# Patient Record
Sex: MALE | Race: WHITE
[De-identification: names, ages, dates, MRNs, and addresses within clinical notes are randomized per-mention and may not be internally consistent; named-entity substitution may affect disease eponyms.]

---

## 2017-10-25 ENCOUNTER — HOSPITAL ENCOUNTER (EMERGENCY)
Dept: HOSPITAL 56 - MW.ED | Age: 52
Discharge: HOME | End: 2017-10-25
Payer: MEDICAID

## 2017-10-25 VITALS — SYSTOLIC BLOOD PRESSURE: 138 MMHG | DIASTOLIC BLOOD PRESSURE: 93 MMHG

## 2017-10-25 DIAGNOSIS — R07.9: Primary | ICD-10-CM

## 2017-10-25 DIAGNOSIS — F32.9: ICD-10-CM

## 2017-10-25 DIAGNOSIS — M15.0: ICD-10-CM

## 2017-10-25 DIAGNOSIS — I10: ICD-10-CM

## 2017-10-25 LAB
CHLORIDE SERPL-SCNC: 106 MMOL/L (ref 98–110)
SODIUM SERPL-SCNC: 146 MMOL/L (ref 136–146)

## 2017-10-25 PROCEDURE — 99285 EMERGENCY DEPT VISIT HI MDM: CPT

## 2017-10-25 PROCEDURE — 80053 COMPREHEN METABOLIC PANEL: CPT

## 2017-10-25 PROCEDURE — 36415 COLL VENOUS BLD VENIPUNCTURE: CPT

## 2017-10-25 PROCEDURE — 93005 ELECTROCARDIOGRAM TRACING: CPT

## 2017-10-25 PROCEDURE — 85025 COMPLETE CBC W/AUTO DIFF WBC: CPT

## 2017-10-25 PROCEDURE — 96372 THER/PROPH/DIAG INJ SC/IM: CPT

## 2017-10-25 PROCEDURE — 96374 THER/PROPH/DIAG INJ IV PUSH: CPT

## 2017-10-25 PROCEDURE — 71010: CPT

## 2017-10-25 PROCEDURE — 84484 ASSAY OF TROPONIN QUANT: CPT

## 2017-10-25 NOTE — EDM.PDOC
ED HPI GENERAL MEDICAL PROBLEM





- General


Stated Complaint: SOB


Time Seen by Provider: 10/25/17 20:13


Source of Information: Reports: Patient


History Limitations: Reports: No Limitations





- History of Present Illness


INITIAL COMMENTS - FREE TEXT/NARRATIVE: 





HISTORY AND PHYSICAL:





History of present illness:


Patient is a 52-year-old male who presents to the emergency room today with 

complaints of sternal chest pain and concerns of high blood pressure. Patient 

reports that he has been without his lisinopril for over one month as he has 

not been able to get to his primary care provider. He states that today he 

started having some midsternal chest pain after drinking several alcoholic 

drinks. He is concerned that this is related to not having his antihypertensive 

medications for "some time". He states he has been taking aspirin daily to 

supplement these medications.


Patient had 325 of aspirin prior to arrival today


Denies any shortness of breath, diaphoresis, nausea, vomiting or diarrhea.


Denies any headache, change in vision, or near syncope.





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 

rancher and drives truck. He states that his joints hurt "all over". Denies any 

recent injury or trauma.





Review of systems: 


As per history of present illness and below otherwise all systems reviewed and 

negative.





Past medical history: 


As per history of present illness and as reviewed below otherwise 

noncontributory.





Surgical history: 


As per history of present illness and as reviewed below otherwise 

noncontributory.





Social history: 


No reported history of drug or alcohol abuse.





Family history: 


As per history of present illness and as reviewed below otherwise 

noncontributory.





Physical exam:


Gen.: Nontoxic appearing 52-year-old male. Able to speak in full sentences 

without shortness of breath. Alert and oriented.


HEENT: Atraumatic, normocephalic, pupils reactive, negative for conjunctival 

pallor or scleral icterus, mucous membranes moist, throat clear, neck supple, 

nontender, trachea midline.


Lungs: Clear to auscultation, breath sounds equal bilaterally, chest nontender.


Heart: S1S2, tachycardia, regular rate and rhythm


Abdomen: Soft, nondistended, nontender. Negative for masses or 

hepatosplenomegaly. Negative for costovertebral tenderness.


Pelvis: Stable nontender.


Genitourinary: Deferred.


Rectal: Deferred.


Extremities: Atraumatic, negative for cords or calf pain. Neurovascular 

unremarkable.


Neuro: Awake, alert, oriented. Cranial nerves II through XII unremarkable. 

Cerebellum unremarkable. Motor and sensory unremarkable throughout. Exam 

nonfocal.





Patient states that he has had several beers prior to arrival. He did have 

325mg aspirin as well. Besides the primary complaint of chest pain patient 

states that his joints hurt all over as he is a "rancher and  "and 

is on his feet all day. He does not have any recent injury, falls or trauma. He 

states he has not seen his primary care provider in several months. When 

further investigating his joint pain he is very nonspecific and unable to state 

what actually hurts. He does say that he does get arthritic pain which this 

does feel similar. At this time I do not feel we need any x-rays related to 

this concern. I did inform him that he needs to follow-up with the primary care 

provider not only for his blood pressure management and concerned that he needs 

antihypertensive medications but also for his arthritic pain. Patient voices 

understanding, although hesitant.





Patient removed his IV and the leads further cardiac monitor. Patient is up 

ambulating in the hallway, gait is steady and patient is alert and oriented. 

Patient's labs, x-ray and EKG were discussed with the patient. He states that 

his chest pain has resolved but still has body aches. Patient was offered 

admission at this time, he declined. Risks vs benefits were reviewed in detail. 

He states he would like a shot of "morphine"for pain and be discharged to home. 

Did state I would give him an injection of Toradol and give him discharge 

instructions. Informed patient to stop taking or than the recommended amount of 

aspirin. Informed him he needed to follow-up with his primary care provider for 

his concerns of his blood pressure. Today's readings were all within normal 

limits and do not require any antihypertensive medications. Patient voices 

understanding and is agreeable to plan of care. Denies any further questions at 

this time.








Diagnostics:


CBC, CMP, troponin, EKG, chest x-ray





Therapeutics:


Aspirin


Nitroglycerin





Impression: 


Chest pain, non-specific


Arthritis





Plan:


1.  You have been offered admission to the hospital at this time, he declined. 

I would like you to follow-up with your primary care provider in the next 1-2 

days for reevaluation. At that time he may also address her concerns of your 

high blood pressure.


2. Stop drinking alcohol


3. Stop taking more that the recommended amounts of NSAIDS (Aspiring, Ibuprofen

, Aleve) as this can cause GI upset and bleeding.


4. Return to the ED as needed and as discussed





Definitive disposition and diagnosis as appropriate pending reevaluation and 

review of above.





Onset: Today


Duration: Hour(s):


Location: Reports: Chest


  ** chest pain


Pain Score (Numeric/FACES): 4





- Related Data


 Allergies











Allergy/AdvReac Type Severity Reaction Status Date / Time


 


Penicillins Allergy  Other Verified 10/25/17 21:03











Home Meds: 


 Home Meds





Lisinopril 40 mg PO DAILY 05/19/16 [History]


Folic Acid 1 mg PO BEDTIME  tablet 05/20/16 [Rx]


LORazepam [Ativan] 1 mg IVPUSH Q2H PRN #2 vial 05/20/16 [Rx]


Multivitamins,Therapeutic [Thera] 1 each PO BEDTIME  tablet 05/20/16 [Rx]


Nicotine [Habitrol] 21 mg TRDERM DAILY  patch 05/20/16 [Rx]


Thiamine [Vitamin B-1] 100 mg PO BEDTIME  tablet 05/20/16 [Rx]


cloNIDine [Catapres] 0.1 mg PO Q8H PRN #0 tablet 05/20/16 [Rx]











Past Medical History


HEENT History: Reports: Allergic Rhinitis


Cardiovascular History: Reports: Hypertension


Psychiatric History: Reports: Addiction, Depression, Suicidal Ideation





- Past Surgical History


Musculoskeletal Surgical History: Reports: Other (See Below)





Social & Family History





- Family History


Cardiac: Reports: Other (See Below)


Other Cardiac Family History: valve replacement


Oncologic: Reports: Esophageal, Lung, Renal





- Tobacco Use


Smoking Status *Q: Light Tobacco Smoker


Years of Tobacco use: 30


Packs/Tins Daily: 1





- Alcohol Use


Days Per Week of Alcohol Use: 7


Number of Drinks Per Day: 10


Total Drinks Per Week: 70





- Recreational Drug Use


Recreational Drug Use: Yes


Recreational Drug Type: Reports: Amphetamines (Speed), Marijuana/Hashish





ED ROS GENERAL





- Review of Systems


Review Of Systems: ROS reveals no pertinent complaints other than HPI.





ED EXAM, GENERAL





- Physical Exam


Exam: See Below (See dictation)





Course





- Vital Signs


Last Recorded V/S: 


 Last Vital Signs











Temp  36.3 C   10/25/17 20:15


 


Pulse  114 H  10/25/17 20:15


 


Resp  18   10/25/17 20:15


 


BP  138/93 H  10/25/17 20:15


 


Pulse Ox  98   10/25/17 20:15














- Orders/Labs/Meds


Orders: 


 Active Orders 24 hr











 Category Date Time Status


 


 EKG Documentation Completion [RC] STAT Care  10/25/17 20:13 Active


 


 Chest 1V Frontal [CR] Stat Exams  10/25/17 20:13 Taken











Labs: 


 Laboratory Tests











  10/25/17 10/25/17 Range/Units





  20:15 20:15 


 


WBC  7.21   (4.0-11.0)  K/uL


 


RBC  4.79   (4.50-5.90)  M/uL


 


Hgb  15.1   (13.0-17.0)  g/dL


 


Hct  43.0   (38.0-50.0)  %


 


MCV  89.8   (80.0-98.0)  fL


 


MCH  31.5   (27.0-32.0)  pg


 


MCHC  35.1   (31.0-37.0)  g/dL


 


RDW Std Deviation  44.8   (28.0-62.0)  fl


 


RDW Coeff of Carlito  14   (11.0-15.0)  %


 


Plt Count  180   (150-400)  K/uL


 


MPV  11.10   (7.40-12.00)  fL


 


Neut % (Auto)  42.1 L   (48.0-80.0)  %


 


Lymph % (Auto)  43.7 H   (16.0-40.0)  %


 


Mono % (Auto)  11.0   (0.0-15.0)  %


 


Eos % (Auto)  2.6   (0.0-7.0)  %


 


Baso % (Auto)  0.6   (0.0-1.5)  %


 


Neut # (Auto)  3.0   (1.4-5.7)  K/uL


 


Lymph # (Auto)  3.2 H   (0.6-2.4)  K/uL


 


Mono # (Auto)  0.8   (0.0-0.8)  K/uL


 


Eos # (Auto)  0.2   (0.0-0.7)  K/uL


 


Baso # (Auto)  0.0   (0.0-0.1)  K/uL


 


Nucleated RBC %  0.0   /100WBC


 


Nucleated RBCs #  0   K/uL


 


Sodium   146  (136-146)  mmol/L


 


Potassium   4.3  (3.5-5.1)  mmol/L


 


Chloride   106  ()  mmol/L


 


Carbon Dioxide   28  (21-31)  mmol/L


 


BUN   11  (6.0-23.0)  mg/dL


 


Creatinine   0.8  (0.6-1.5)  mg/dL


 


Est Cr Clr Drug Dosing   131.10  mL/min


 


Estimated GFR (MDRD)   > 60.0  ml/min


 


Glucose   111 H  ()  mg/dL


 


Calcium   9.1  (8.8-10.8)  mg/dL


 


Total Bilirubin   0.5  (0.1-1.5)  mg/dL


 


AST   34  (5-40)  IU/L


 


ALT   19  (8-54)  IU/L


 


Alkaline Phosphatase   123  ()  


 


Troponin I   < 0.10  (0.0-0.29)  NG/ML


 


Total Protein   7.6  (6.0-8.0)  g/dL


 


Albumin   3.8  (3.5-5.0)  g/dL


 


Globulin   3.8 H  (2.0-3.5)  g/dL


 


Albumin/Globulin Ratio   1.0 L  (1.3-2.8)  











Meds: 


Medications














Discontinued Medications














Generic Name Dose Route Start Last Admin





  Trade Name Freq  PRN Reason Stop Dose Admin


 


Sodium Chloride  1,000 mls @ 999 mls/hr  10/25/17 20:13  10/25/17 20:36





  Normal Saline  IV  10/25/17 21:13  999 mls/hr





  STAT ONE   Administration


 


Ketorolac Tromethamine  30 mg  10/25/17 20:55  





  Toradol  IVPUSH  10/25/17 20:56  





  ONETIME ONE   


 


Ketorolac Tromethamine  60 mg  10/25/17 21:17  





  Toradol  IM  10/25/17 21:18  





  ONETIME ONE   


 


Ondansetron HCl  4 mg  10/25/17 20:13  10/25/17 20:36





  Zofran  IVPUSH  10/25/17 20:14  4 mg





  ONETIME ONE   Administration














Departure





- Departure


Time of Disposition: 21:24


Disposition: Home, Self-Care 01


Clinical Impression: 


 Nonspecific chest pain, Arthritis





Referrals: 


PCP,None [Primary Care Provider] - 


Additional Instructions: 


My general discharge


The following information is given to patients seen in the emergency department 

who are being discharged to home. This information is to outline your options 

for follow-up care. We provide all patients seen in our emergency department 

with a follow-up referral.





The need for follow-up, as well as the timing and circumstances, are variable 

depending upon the specifics of your emergency department visit.





If you don't have a primary care physician on staff, we will provide you with a 

referral. We always advise you to contact your personal physician following an 

emergency department visit to inform them of the circumstance of the visit and 

for follow-up with them and/or the need for any referrals to a consulting 

specialist.





The emergency department will also refer you to a specialist when appropriate. 

This referral assures that you have the opportunity for follow-up care with a 

specialist. All of these measure are taken in an effort to provide you with 

optimal care, which includes your follow-up.





Under all circumstances we always encourage you to contact your private 

physician who remains a resource for coordinating your care. When calling for 

follow-up care, please make the office aware that this follow-up is from your 

recent emergency room visit. If for any reason you are refused follow-up, 

please contact the [facility] Emergency 

Department at [phone] and asked to speak to the emergency department 

charge nurse.





[facility]


Primary Care


[address] [place] [address]


Phone: [phone]


Fax: [phone]





1.  You have been offered admission to the hospital at this time, he declined. 

I would like you to follow-up with your primary care provider in the next 1-2 

days for reevaluation. At that time he may also address her concerns of your 

high blood pressure.


2. Stop drinking alcohol


3. Stop taking more that the recommended amounts of NSAIDS (Aspiring, Ibuprofen

, Aleve) as this can cause GI upset and bleeding.


4. Return to the ED as needed and as discussed





- My Orders


Last 24 Hours: 


My Active Orders





10/25/17 20:13


EKG Documentation Completion [RC] STAT 


Chest 1V Frontal [CR] Stat 














- Assessment/Plan


Last 24 Hours: 


My Active Orders





10/25/17 20:13


EKG Documentation Completion [RC] STAT 


Chest 1V Frontal [CR] Stat

## 2017-10-26 NOTE — CR
EXAM DATE: 10/25/17



PATIENT'S AGE: 52





Patient: JOVANNI ROSARIO



Facility: [place], [place]

Patient ID: [id number]

Site Patient ID: [id number].

Site Accession #: [id number]

: 1965

Study: XRay Chest [id number]/25/2017 9:04:27 PM

Ordering Physician: Doctor Temp



Final Report: 

INDICATION: chest pain



TECHNIQUE:

Chest 1 view 



COMPARISON:

None 



FINDINGS:

Cardiovascular and mediastinum: Heart size and vasculature are normal in 
caliber and appearance. Mediastinum is within normal limits. 

Lungs and pleural space: No focal consolidation. No sign of pleural effusion. 
No pneumothorax. 

Bones: Remote healed 5th posterior left rib fracture. 



IMPRESSION:

No acute cardiopulmonary disease.



Dictated by Edward Stone MD @ 10/25/2017 9:16:37 PM





Dictated by: Edward Stone MD @ 10/25/2017 21:16:52

(Electronic Signature)



Report Signed by Proxy.
[facility]

## 2017-10-27 ENCOUNTER — HOSPITAL ENCOUNTER (EMERGENCY)
Dept: HOSPITAL 56 - MW.ED | Age: 52
Discharge: HOME | End: 2017-10-27
Payer: MEDICAID

## 2017-10-27 VITALS — SYSTOLIC BLOOD PRESSURE: 117 MMHG | DIASTOLIC BLOOD PRESSURE: 75 MMHG

## 2017-10-27 DIAGNOSIS — Z88.0: ICD-10-CM

## 2017-10-27 DIAGNOSIS — Z79.899: ICD-10-CM

## 2017-10-27 DIAGNOSIS — F10.10: ICD-10-CM

## 2017-10-27 DIAGNOSIS — I10: ICD-10-CM

## 2017-10-27 DIAGNOSIS — F17.210: ICD-10-CM

## 2017-10-27 DIAGNOSIS — R07.89: Primary | ICD-10-CM

## 2017-10-27 LAB
CHLORIDE SERPL-SCNC: 106 MMOL/L (ref 98–110)
SODIUM SERPL-SCNC: 141 MMOL/L (ref 136–146)

## 2017-10-27 NOTE — CR
EXAM DATE: 10/27/17



PATIENT'S AGE: 52



Patient: JOVANNI ROSARIO



Facility: [place], [place]

Patient ID: [id number]

Site Patient ID: [id number].

Site Accession #: [id number]

: 1965

Study: XRay Chest [id number]/27/2017 12:49:20 AM

Ordering Physician: Doctor Wilson



Final Report: 

INDICATION:

Chest pain, intoxication. 



TECHNIQUE:

Chest radiograph 1 view 



COMPARISON:

10/25/2017. 



FINDINGS:

Cardiovascular and mediastinum: The heart silhouette is normal in size and 
morphology. The mediastinum is normal in appearance. 

Lungs and pleural spaces: Both lungs are unremarkable in appearance. No sign of 
pleural effusion seen. No pneumothorax is identified. 

Bones and soft tissues: No significant findings. Old upper left rib fracture 
deformity. 



IMPRESSION:

1. No acute abnormality or interval change from 10/25/2017.



Dictated by Sha Akins MD @ 10/27/2017 12:51:52 AM



Dictated by: Sha Akins MD @ 10/27/2017 00:51:58

(Electronic Signature)



Report Signed by Proxy.
[facility]

## 2017-10-27 NOTE — EDM.PDOC
ED HPI GENERAL MEDICAL PROBLEM





- General


Chief Complaint: Chest Pain


Stated Complaint: OVERDOSE /INTOXICATED/CHEST PAIN


Time Seen by Provider: 10/27/17 00:39





- History of Present Illness


INITIAL COMMENTS - FREE TEXT/NARRATIVE: 





HISTORY AND PHYSICAL:





History of present illness:


Patient is 52-year-old white male presents with a concern of chest pain he was 

seen yesterday for same this is vaguely described without associated shortness 

breath palpitations nausea vomiting or diaphoresis





Review of systems: 


As per history of present illness and below otherwise all systems reviewed and 

negative.





Past medical history: 


As per history of present illness and as reviewed below otherwise 

noncontributory.





Surgical history: 


As per history of present illness and as reviewed below otherwise 

noncontributory.





Social history: 


No reported history of drug or alcohol abuse.





Family history: 


As per history of present illness and as reviewed below otherwise 

noncontributory.





Physical exam:


HEENT: Atraumatic, normocephalic, pupils reactive, negative for conjunctival 

pallor or scleral icterus, mucous membranes moist, throat clear, neck supple, 

nontender, trachea midline.


Lungs: Clear to auscultation, breath sounds equal bilaterally, chest nontender.


Heart: S1S2, regular, negative for clicks, rubs, or JVD.


Abdomen: Soft, nondistended, nontender. Negative for masses or 

hepatosplenomegaly. Negative for costovertebral tenderness.


Pelvis: Stable nontender.


Genitourinary: Deferred.


Rectal: Deferred.


Extremities: Atraumatic, negative for cords or calf pain. Neurovascular 

unremarkable.


Neuro: Awake, alert, oriented. Cranial nerves II through XII unremarkable. 

Cerebellum unremarkable. Motor and sensory unremarkable throughout. Exam 

nonfocal.





Diagnostics:


CBC CMP troponin PT/INR chest x-ray EKG





Therapeutics:


None





Impression: 


#1 atypical chest pain #2 history of ethanol abuse





Definitive disposition and diagnosis as appropriate pending reevaluation and 

review of above.





- Related Data


 Allergies











Allergy/AdvReac Type Severity Reaction Status Date / Time


 


Penicillins Allergy  Other Verified 10/27/17 00:35











Home Meds: 


 Home Meds





Lisinopril 40 mg PO DAILY 05/19/16 [History]


Folic Acid 1 mg PO BEDTIME  tablet 05/20/16 [Rx]


LORazepam [Ativan] 1 mg IVPUSH Q2H PRN #2 vial 05/20/16 [Rx]


Multivitamins,Therapeutic [Thera] 1 each PO BEDTIME  tablet 05/20/16 [Rx]


Nicotine [Habitrol] 21 mg TRDERM DAILY  patch 05/20/16 [Rx]


Thiamine [Vitamin B-1] 100 mg PO BEDTIME  tablet 05/20/16 [Rx]


cloNIDine [Catapres] 0.1 mg PO Q8H PRN #0 tablet 05/20/16 [Rx]











Past Medical History


HEENT History: Reports: Allergic Rhinitis


Cardiovascular History: Reports: Hypertension


Respiratory History: Reports: None


Gastrointestinal History: Reports: None


Genitourinary History: Reports: None


Musculoskeletal History: Reports: None


Neurological History: Reports: None


Psychiatric History: Reports: Addiction, Depression, Suicidal Ideation


Endocrine/Metabolic History: Reports: None


Hematologic History: Reports: None


Immunologic History: Reports: None


Oncologic (Cancer) History: Reports: None


Dermatologic History: Reports: None





- Infectious Disease History


Infectious Disease History: Reports: None





- Past Surgical History


Musculoskeletal Surgical History: Reports: Other (See Below)





Social & Family History





- Family History


Family Medical History: Noncontributory


Cardiac: Reports: Other (See Below)


Other Cardiac Family History: valve replacement


Oncologic: Reports: Esophageal, Lung, Renal





- Tobacco Use


Smoking Status *Q: Light Tobacco Smoker


Years of Tobacco use: 30


Packs/Tins Daily: 1





- Caffeine Use


Caffeine Use: Reports: Coffee





- Alcohol Use


Days Per Week of Alcohol Use: 7


Number of Drinks Per Day: 10


Total Drinks Per Week: 70





- Recreational Drug Use


Recreational Drug Use: Yes


Recreational Drug Type: Reports: Amphetamines (Speed), Marijuana/Hashish





ED ROS GENERAL





- Review of Systems


Review Of Systems: ROS reveals no pertinent complaints other than HPI.





ED EXAM, GENERAL





- Physical Exam


Exam: See Below (See dictation)





Course





- Orders/Labs/Meds


Orders: 





 Active Orders 24 hr











 Category Date Time Status


 


 EKG 12 Lead [EKG Documentation Completion] [RC] STAT Care  10/27/17 00:34 

Active


 


 Chest 1V Frontal [CR] Stat Exams  10/27/17 00:34 Ordered


 


 CBC WITH AUTO DIFF [HEME] Stat Lab  10/27/17 00:34 Ordered


 


 CKMB [CHEM] Stat Lab  10/27/17 00:34 Ordered


 


 COMPREHENSIVE METABOLIC PN,CMP [CHEM] Stat Lab  10/27/17 00:34 Ordered


 


 TROPONIN I [CHEM] Stat Lab  10/27/17 00:34 Ordered














Departure





- Departure


Time of Disposition: 00:40


Disposition: Home, Self-Care 01


Condition: Good


Clinical Impression: 


 Atypical chest pain








- Discharge Information


Referrals: 


PCP,None [Primary Care Provider] - 


Additional Instructions: 


The following information is given to patients seen in the emergency department 

who are being discharged to home. This information is to outline your options 

for follow-up care. We provide all patients seen in our emergency department 

with a follow-up referral.





The need for follow-up, as well as the timing and circumstances, are variable 

depending upon the specifics of your emergency department visit.





If you don't have a primary care physician on staff, we will provide you with a 

referral. We always advise you to contact your personal physician following an 

emergency department visit to inform them of the circumstance of the visit and 

for follow-up with them and/or the need for any referrals to a consulting 

specialist.





The emergency department will also refer you to a specialist when appropriate. 

This referral assures that you have the opportunity for followup care with a 

specialist. All of these measure are taken in an effort to provide you with 

optimal care, which includes your followup.





Under all circumstances we always encourage you to contact your private 

physician who remains a resource for coordinating  your care. When calling for 

followup care, please make the office aware that this follow-up is from your 

recent emergency room visit. If for any reason you are refused follow-up, 

please contact the [facility] emergency department at [phone] 

and asked to speak to the emergency department charge nurse.








[facility]


Primary Care


[address] [place] [id number]


Phone: [phone]


Fax: [phone]

















Follow-up primary medical doctor/clinic above 1-2 days return as needed as 

discussed





- My Orders


Last 24 Hours: 





My Active Orders





10/27/17 00:34


EKG 12 Lead [EKG Documentation Completion] [RC] STAT 


Chest 1V Frontal [CR] Stat 


CBC WITH AUTO DIFF [HEME] Stat 


CKMB [CHEM] Stat 


COMPREHENSIVE METABOLIC PN,CMP [CHEM] Stat 


TROPONIN I [CHEM] Stat 














- Assessment/Plan


Last 24 Hours: 





My Active Orders





10/27/17 00:34


EKG 12 Lead [EKG Documentation Completion] [RC] STAT 


Chest 1V Frontal [CR] Stat 


CBC WITH AUTO DIFF [HEME] Stat 


CKMB [CHEM] Stat 


COMPREHENSIVE METABOLIC PN,CMP [CHEM] Stat 


TROPONIN I [CHEM] Stat

## 2017-11-01 ENCOUNTER — HOSPITAL ENCOUNTER (EMERGENCY)
Dept: HOSPITAL 56 - MW.ED | Age: 52
Discharge: NURSING, REHAB OR LONG-TERM CARE | End: 2017-11-01
Payer: MEDICAID

## 2017-11-01 VITALS — DIASTOLIC BLOOD PRESSURE: 83 MMHG | SYSTOLIC BLOOD PRESSURE: 127 MMHG

## 2017-11-01 DIAGNOSIS — F32.9: ICD-10-CM

## 2017-11-01 DIAGNOSIS — Z88.0: ICD-10-CM

## 2017-11-01 DIAGNOSIS — Z79.899: ICD-10-CM

## 2017-11-01 DIAGNOSIS — I10: ICD-10-CM

## 2017-11-01 DIAGNOSIS — F10.20: Primary | ICD-10-CM

## 2017-11-01 LAB
CHLORIDE SERPL-SCNC: 100 MMOL/L (ref 98–110)
SODIUM SERPL-SCNC: 138 MMOL/L (ref 136–146)

## 2017-11-01 PROCEDURE — 83735 ASSAY OF MAGNESIUM: CPT

## 2017-11-01 PROCEDURE — 85025 COMPLETE CBC W/AUTO DIFF WBC: CPT

## 2017-11-01 PROCEDURE — 93005 ELECTROCARDIOGRAM TRACING: CPT

## 2017-11-01 PROCEDURE — 80053 COMPREHEN METABOLIC PANEL: CPT

## 2017-11-01 PROCEDURE — 96365 THER/PROPH/DIAG IV INF INIT: CPT

## 2017-11-01 PROCEDURE — 99285 EMERGENCY DEPT VISIT HI MDM: CPT

## 2017-11-01 PROCEDURE — G0480 DRUG TEST DEF 1-7 CLASSES: HCPCS

## 2017-11-01 PROCEDURE — 96375 TX/PRO/DX INJ NEW DRUG ADDON: CPT

## 2017-11-01 PROCEDURE — 36415 COLL VENOUS BLD VENIPUNCTURE: CPT

## 2017-11-01 NOTE — EDM.PDOC
ED HPI GENERAL MEDICAL PROBLEM





- General


Chief Complaint: Drug or Alcohol Abuse


Stated Complaint: AMBULANCE


Time Seen by Provider: 11/01/17 00:40


Source of Information: Reports: Patient, Police, RN





- History of Present Illness


INITIAL COMMENTS - FREE TEXT/NARRATIVE: 





He is bought by police. His mother called police because she was worried about 

him. He had some relationship problems and has been drinking heavy. He said 

that he had a "huge bottle" of vodka today.





vomited this am





no bleeding





He says " I  hurt everywhere"





He said that he wants to die but has no active plans.





no fever


no reported trauma











  ** Chest


Pain Score (Numeric/FACES): 3





  ** Abdominal


Pain Score (Numeric/FACES): 2





- Related Data


 Allergies











Allergy/AdvReac Type Severity Reaction Status Date / Time


 


Penicillins Allergy  Other Verified 11/01/17 00:30











Home Meds: 


 Home Meds





Lisinopril 40 mg PO DAILY 05/19/16 [History]


Folic Acid 1 mg PO BEDTIME  tablet 05/20/16 [Rx]


LORazepam [Ativan] 1 mg IVPUSH Q2H PRN #2 vial 05/20/16 [Rx]


Multivitamins,Therapeutic [Thera] 1 each PO BEDTIME  tablet 05/20/16 [Rx]


Nicotine [Habitrol] 21 mg TRDERM DAILY  patch 05/20/16 [Rx]


Thiamine [Vitamin B-1] 100 mg PO BEDTIME  tablet 05/20/16 [Rx]


cloNIDine [Catapres] 0.1 mg PO Q8H PRN #0 tablet 05/20/16 [Rx]











Past Medical History





- Past Health History


Medical/Surgical History: Denies Medical/Surgical History


HEENT History: Reports: Allergic Rhinitis


Cardiovascular History: Reports: Hypertension


Respiratory History: Reports: None


Gastrointestinal History: Reports: None


Genitourinary History: Reports: None


Musculoskeletal History: Reports: None


Neurological History: Reports: None


Psychiatric History: Reports: Addiction, Depression, Suicidal Ideation


Endocrine/Metabolic History: Reports: None


Hematologic History: Reports: None


Immunologic History: Reports: None


Oncologic (Cancer) History: Reports: None


Dermatologic History: Reports: None





- Infectious Disease History


Infectious Disease History: Reports: None





- Past Surgical History


Musculoskeletal Surgical History: Reports: Other (See Below)





Social & Family History





- Family History


Family Medical History: Noncontributory


Cardiac: Reports: Other (See Below)


Other Cardiac Family History: valve replacement


Oncologic: Reports: Esophageal, Lung, Renal





- Tobacco Use


Smoking Status *Q: Unknown Ever Smoked


Years of Tobacco use: 30


Packs/Tins Daily: 1


Tobacco Use Comment: chew tobacco





- Caffeine Use


Caffeine Use: Reports: Coffee





- Alcohol Use


Days Per Week of Alcohol Use: 7


Number of Drinks Per Day: 10


Total Drinks Per Week: 70





- Recreational Drug Use


Recreational Drug Use: Yes


Recreational Drug Type: Reports: Amphetamines (Speed), Marijuana/Hashish





ED ROS GENERAL





- Review of Systems


Review Of Systems: See Below


Constitutional: Denies: Fever





- Physical Exam


Exam: See Below


Text/Narrative:: 





alert


talkative 


cooperative


lungs CTA


abdomen non tender


no head trauma


neck supple


normal speech


no facial droop


no tremor








Course





- Vital Signs


Last Recorded V/S: 


 Last Vital Signs











Temp  97.7 F   11/01/17 00:30


 


Pulse  120 H  11/01/17 00:30


 


Resp  20   11/01/17 00:30


 


BP  139/85   11/01/17 00:30


 


Pulse Ox  97   11/01/17 00:30














- Orders/Labs/Meds


Orders: 


 Active Orders 24 hr











 Category Date Time Status


 


 EKG 12 Lead [EKG Documentation Completion] [RC] STAT Care  11/01/17 00:40 

Active


 


 Blood Alcohol [ETHANOL BLOOD MEDICAL] [CHEM] Stat Lab  11/01/17 00:48 Ordered


 


 CBC WITH AUTO DIFF [HEME] Stat Lab  11/01/17 00:47 Ordered


 


 COMPREHENSIVE METABOLIC PN,CMP [CHEM] Stat Lab  11/01/17 00:47 Ordered


 


 MAGNESIUM [CHEM] Stat Lab  11/01/17 00:47 Ordered


 


 MVI, Adult with Vitamin K [Infuvite Adult] 10 ml Med  11/01/17 00:47 Ordered





 Thiamine [Vitamin B-1] 100 mg   





 Folic Acid 1 mg   





 Sodium Chloride 0.9% [Normal Saline] 1,000 ml   





 IV ONETIME   








 Medication Orders





Multivitamins/Minerals 10 ml/Thiamine HCl 100 mg/ Folic Acid 1 mg/ Sodium 

Chloride  1,011.2 mls @ 250 mls/hr IV ONETIME ONE


   Stop: 11/01/17 04:49








Meds: 


Medications











Generic Name Dose Route Start Last Admin





  Trade Name Freq  PRN Reason Stop Dose Admin


 


Multivitamins/Minerals 10 ml/  1,011.2 mls @ 250 mls/hr  11/01/17 00:47  





Thiamine HCl 100 mg/ Folic  IV  11/01/17 04:49  





Acid 1 mg/ Sodium Chloride  ONETIME ONE   














- Re-Assessments/Exams


Free Text/Narrative Re-Assessment/Exam: 





11/01/17 00:58


 I spoke with Samantha Merlos who agrees to take patient in transfer





Sha Bonilla MD





Departure





- Departure


Time of Disposition: 00:59


Disposition: DC[facility] to [facility]


Condition: Fair


Clinical Impression: 


 Alcoholism








- Discharge Information


Forms:  ED Department Discharge





- My Orders


Last 24 Hours: 


My Active Orders





11/01/17 00:40


EKG 12 Lead [EKG Documentation Completion] [RC] STAT 





11/01/17 00:47


CBC WITH AUTO DIFF [HEME] Stat 


COMPREHENSIVE METABOLIC PN,CMP [CHEM] Stat 


MAGNESIUM [CHEM] Stat 


MVI, Adult with Vitamin K [Infuvite Adult] 10 ml Thiamine [Vitamin B-1] 100 mg 

Folic Acid 1 mg   Sodium Chloride 0.9% [Normal Saline] 1,000 ml IV ONETIME 





11/01/17 00:48


Blood Alcohol [ETHANOL BLOOD MEDICAL] [CHEM] Stat 














- Assessment/Plan


Last 24 Hours: 


My Active Orders





11/01/17 00:40


EKG 12 Lead [EKG Documentation Completion] [RC] STAT 





11/01/17 00:47


CBC WITH AUTO DIFF [HEME] Stat 


COMPREHENSIVE METABOLIC PN,CMP [CHEM] Stat 


MAGNESIUM [CHEM] Stat 


MVI, Adult with Vitamin K [Infuvite Adult] 10 ml Thiamine [Vitamin B-1] 100 mg 

Folic Acid 1 mg   Sodium Chloride 0.9% [Normal Saline] 1,000 ml IV ONETIME 





11/01/17 00:48


Blood Alcohol [ETHANOL BLOOD MEDICAL] [CHEM] Stat

## 2018-05-04 ENCOUNTER — HOSPITAL ENCOUNTER (EMERGENCY)
Dept: HOSPITAL 41 - JD.ED | Age: 53
Discharge: HOME | End: 2018-05-04
Payer: MEDICAID

## 2018-05-04 VITALS — SYSTOLIC BLOOD PRESSURE: 140 MMHG | DIASTOLIC BLOOD PRESSURE: 104 MMHG

## 2018-05-04 DIAGNOSIS — F10.230: ICD-10-CM

## 2018-05-04 DIAGNOSIS — Z88.0: ICD-10-CM

## 2018-05-04 DIAGNOSIS — Z79.899: ICD-10-CM

## 2018-05-04 DIAGNOSIS — I10: ICD-10-CM

## 2018-05-04 DIAGNOSIS — D69.6: ICD-10-CM

## 2018-05-04 DIAGNOSIS — G40.409: Primary | ICD-10-CM

## 2018-05-04 PROCEDURE — 80053 COMPREHEN METABOLIC PANEL: CPT

## 2018-05-04 PROCEDURE — 83735 ASSAY OF MAGNESIUM: CPT

## 2018-05-04 PROCEDURE — 36415 COLL VENOUS BLD VENIPUNCTURE: CPT

## 2018-05-04 PROCEDURE — 96374 THER/PROPH/DIAG INJ IV PUSH: CPT

## 2018-05-04 PROCEDURE — 72125 CT NECK SPINE W/O DYE: CPT

## 2018-05-04 PROCEDURE — G0480 DRUG TEST DEF 1-7 CLASSES: HCPCS

## 2018-05-04 PROCEDURE — 70450 CT HEAD/BRAIN W/O DYE: CPT

## 2018-05-04 PROCEDURE — 85025 COMPLETE CBC W/AUTO DIFF WBC: CPT

## 2018-05-04 PROCEDURE — 99285 EMERGENCY DEPT VISIT HI MDM: CPT

## 2019-03-14 ENCOUNTER — HOSPITAL ENCOUNTER (EMERGENCY)
Dept: HOSPITAL 41 - JD.ED | Age: 54
Discharge: HOME | End: 2019-03-14
Payer: MEDICAID

## 2019-03-14 VITALS — SYSTOLIC BLOOD PRESSURE: 139 MMHG | DIASTOLIC BLOOD PRESSURE: 92 MMHG

## 2019-03-14 DIAGNOSIS — Z88.0: ICD-10-CM

## 2019-03-14 DIAGNOSIS — Z79.899: ICD-10-CM

## 2019-03-14 DIAGNOSIS — I10: ICD-10-CM
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
## 2019-03-14 DIAGNOSIS — F10.129: Primary | ICD-10-CM

## 2019-03-14 PROCEDURE — 80053 COMPREHEN METABOLIC PANEL: CPT

## 2019-03-14 PROCEDURE — 86140 C-REACTIVE PROTEIN: CPT

## 2019-03-14 PROCEDURE — 36415 COLL VENOUS BLD VENIPUNCTURE: CPT

## 2019-03-14 PROCEDURE — 80306 DRUG TEST PRSMV INSTRMNT: CPT

## 2019-03-14 PROCEDURE — 99284 EMERGENCY DEPT VISIT MOD MDM: CPT

## 2019-03-14 PROCEDURE — 85025 COMPLETE CBC W/AUTO DIFF WBC: CPT

## 2019-03-14 PROCEDURE — G0480 DRUG TEST DEF 1-7 CLASSES: HCPCS

## 2019-05-15 ENCOUNTER — HOSPITAL ENCOUNTER (EMERGENCY)
Dept: HOSPITAL 41 - JD.ED | Age: 54
Discharge: HOME | End: 2019-05-15
Payer: SELF-PAY

## 2019-05-15 VITALS — DIASTOLIC BLOOD PRESSURE: 88 MMHG | SYSTOLIC BLOOD PRESSURE: 133 MMHG

## 2019-05-15 DIAGNOSIS — Z88.0: ICD-10-CM

## 2019-05-15 DIAGNOSIS — Z79.899: ICD-10-CM

## 2019-05-15 DIAGNOSIS — F32.9: ICD-10-CM

## 2019-05-15 DIAGNOSIS — I10: ICD-10-CM

## 2019-05-15 DIAGNOSIS — F10.929: Primary | ICD-10-CM
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
## 2019-05-15 PROCEDURE — G0480 DRUG TEST DEF 1-7 CLASSES: HCPCS

## 2019-05-15 PROCEDURE — 36415 COLL VENOUS BLD VENIPUNCTURE: CPT

## 2019-05-15 PROCEDURE — 85025 COMPLETE CBC W/AUTO DIFF WBC: CPT

## 2019-05-15 PROCEDURE — 99285 EMERGENCY DEPT VISIT HI MDM: CPT

## 2019-05-15 PROCEDURE — 80306 DRUG TEST PRSMV INSTRMNT: CPT

## 2019-05-15 PROCEDURE — 84443 ASSAY THYROID STIM HORMONE: CPT

## 2019-05-15 PROCEDURE — 80053 COMPREHEN METABOLIC PANEL: CPT
